# Patient Record
Sex: FEMALE | Race: WHITE | NOT HISPANIC OR LATINO
[De-identification: names, ages, dates, MRNs, and addresses within clinical notes are randomized per-mention and may not be internally consistent; named-entity substitution may affect disease eponyms.]

---

## 2024-07-29 ENCOUNTER — DASHBOARD ENCOUNTERS (OUTPATIENT)
Age: 34
End: 2024-07-29

## 2024-07-29 ENCOUNTER — LAB OUTSIDE AN ENCOUNTER (OUTPATIENT)
Dept: URBAN - NONMETROPOLITAN AREA CLINIC 13 | Facility: CLINIC | Age: 34
End: 2024-07-29

## 2024-07-29 ENCOUNTER — OFFICE VISIT (OUTPATIENT)
Dept: URBAN - NONMETROPOLITAN AREA CLINIC 13 | Facility: CLINIC | Age: 34
End: 2024-07-29
Payer: OTHER GOVERNMENT

## 2024-07-29 VITALS
DIASTOLIC BLOOD PRESSURE: 93 MMHG | SYSTOLIC BLOOD PRESSURE: 142 MMHG | HEART RATE: 96 BPM | WEIGHT: 189.8 LBS | HEIGHT: 63 IN | TEMPERATURE: 98 F | BODY MASS INDEX: 33.63 KG/M2

## 2024-07-29 DIAGNOSIS — R93.5 ABNORMAL CT OF THE ABDOMEN: ICD-10-CM

## 2024-07-29 DIAGNOSIS — K57.30 DIVERTICULOSIS OF THE COLON: ICD-10-CM

## 2024-07-29 PROBLEM — 397881000: Status: ACTIVE | Noted: 2024-07-29

## 2024-07-29 LAB
A/G RATIO: 1.3
ALBUMIN: 4.7
ALKALINE PHOSPHATASE: 60
ALT (SGPT): 35
ANION GAP: 13
AST (SGOT): 29
BILIRUBIN TOTAL: 0.5
BLOOD UREA NITROGEN: 13
BUN / CREAT RATIO: 14
CALCIUM: 9.8
CHLORIDE: 104
CO2: 24
CREATININE, SERUM: 0.91
EGFR (CKD-EPI): >60
GLUCOSE: 100
POTASSIUM: 4.1
PROTEIN TOTAL: 8.3
SODIUM: 137

## 2024-07-29 PROCEDURE — 99214 OFFICE O/P EST MOD 30 MIN: CPT | Performed by: NURSE PRACTITIONER

## 2024-07-29 NOTE — HPI-TODAY'S VISIT:
Patient is a pleasant 34-year-old who we have been asked to consult [facility] by [facility] for abnormal imaging of liver.  She had a CT of her abdomen last December in the ER which showed a subcentimeter lesion on the liver.  Symptoms that initially prompted her to get CT were infectious in origin and have resolved.  She is currently without any GI complaints.  She does not drink any alcohol.  She denies any family history of liver disease that she is aware of, but does state that she has some family she does not interact with much related to their alcoholism sb

## 2024-07-30 ENCOUNTER — WEB ENCOUNTER (OUTPATIENT)
Dept: URBAN - NONMETROPOLITAN AREA CLINIC 2 | Facility: CLINIC | Age: 34
End: 2024-07-30

## 2024-08-16 ENCOUNTER — TELEPHONE ENCOUNTER (OUTPATIENT)
Dept: URBAN - NONMETROPOLITAN AREA CLINIC 2 | Facility: CLINIC | Age: 34
End: 2024-08-16

## 2024-10-28 ENCOUNTER — OFFICE VISIT (OUTPATIENT)
Dept: URBAN - NONMETROPOLITAN AREA CLINIC 13 | Facility: CLINIC | Age: 34
End: 2024-10-28